# Patient Record
Sex: FEMALE | Race: WHITE | NOT HISPANIC OR LATINO
[De-identification: names, ages, dates, MRNs, and addresses within clinical notes are randomized per-mention and may not be internally consistent; named-entity substitution may affect disease eponyms.]

---

## 2018-03-01 PROBLEM — Z00.00 ENCOUNTER FOR PREVENTIVE HEALTH EXAMINATION: Status: ACTIVE | Noted: 2018-03-01

## 2018-03-06 ENCOUNTER — TRANSCRIPTION ENCOUNTER (OUTPATIENT)
Age: 33
End: 2018-03-06

## 2018-03-06 ENCOUNTER — RESULT REVIEW (OUTPATIENT)
Age: 33
End: 2018-03-06

## 2018-03-06 ENCOUNTER — OUTPATIENT (OUTPATIENT)
Dept: OUTPATIENT SERVICES | Facility: HOSPITAL | Age: 33
LOS: 1 days | End: 2018-03-06
Payer: MEDICAID

## 2018-03-06 DIAGNOSIS — B18.2 CHRONIC VIRAL HEPATITIS C: ICD-10-CM

## 2018-03-06 DIAGNOSIS — K21.0 GASTRO-ESOPHAGEAL REFLUX DISEASE WITH ESOPHAGITIS: ICD-10-CM

## 2018-03-06 LAB — HCG UR QL: NEGATIVE — SIGNIFICANT CHANGE UP

## 2018-03-06 PROCEDURE — 88312 SPECIAL STAINS GROUP 1: CPT | Mod: 26

## 2018-03-06 PROCEDURE — 88313 SPECIAL STAINS GROUP 2: CPT

## 2018-03-06 PROCEDURE — 81025 URINE PREGNANCY TEST: CPT

## 2018-03-06 PROCEDURE — 43239 EGD BIOPSY SINGLE/MULTIPLE: CPT

## 2018-03-06 PROCEDURE — 88305 TISSUE EXAM BY PATHOLOGIST: CPT

## 2018-03-06 PROCEDURE — 88312 SPECIAL STAINS GROUP 1: CPT

## 2018-03-06 PROCEDURE — 88305 TISSUE EXAM BY PATHOLOGIST: CPT | Mod: 26

## 2018-03-06 PROCEDURE — 88313 SPECIAL STAINS GROUP 2: CPT | Mod: 26

## 2018-03-07 LAB — SURGICAL PATHOLOGY FINAL REPORT - CH: SIGNIFICANT CHANGE UP

## 2018-05-16 ENCOUNTER — APPOINTMENT (OUTPATIENT)
Dept: DERMATOLOGY | Facility: CLINIC | Age: 33
End: 2018-05-16
Payer: MEDICAID

## 2018-05-16 PROCEDURE — 99202 OFFICE O/P NEW SF 15 MIN: CPT

## 2018-06-13 ENCOUNTER — APPOINTMENT (OUTPATIENT)
Dept: DERMATOLOGY | Facility: CLINIC | Age: 33
End: 2018-06-13

## 2019-02-10 ENCOUNTER — EMERGENCY (EMERGENCY)
Facility: HOSPITAL | Age: 34
LOS: 1 days | Discharge: OTHER INSTITUTION | End: 2019-02-10
Attending: EMERGENCY MEDICINE | Admitting: EMERGENCY MEDICINE
Payer: MEDICAID

## 2019-02-10 ENCOUNTER — EMERGENCY (EMERGENCY)
Facility: HOSPITAL | Age: 34
LOS: 1 days | Discharge: HOME | End: 2019-02-10
Attending: EMERGENCY MEDICINE | Admitting: EMERGENCY MEDICINE
Payer: COMMERCIAL

## 2019-02-10 ENCOUNTER — INPATIENT (INPATIENT)
Facility: HOSPITAL | Age: 34
LOS: 15 days | Discharge: HOME | End: 2019-02-26
Attending: PSYCHIATRY & NEUROLOGY | Admitting: PSYCHIATRY & NEUROLOGY
Payer: MEDICAID

## 2019-02-10 VITALS — WEIGHT: 170.2 LBS | RESPIRATION RATE: 18 BRPM | TEMPERATURE: 99 F | HEIGHT: 62 IN

## 2019-02-10 VITALS
RESPIRATION RATE: 18 BRPM | SYSTOLIC BLOOD PRESSURE: 133 MMHG | OXYGEN SATURATION: 100 % | HEART RATE: 84 BPM | DIASTOLIC BLOOD PRESSURE: 71 MMHG | TEMPERATURE: 98 F

## 2019-02-10 VITALS
TEMPERATURE: 98 F | RESPIRATION RATE: 17 BRPM | HEART RATE: 73 BPM | SYSTOLIC BLOOD PRESSURE: 109 MMHG | DIASTOLIC BLOOD PRESSURE: 68 MMHG | OXYGEN SATURATION: 99 %

## 2019-02-10 VITALS
HEART RATE: 92 BPM | OXYGEN SATURATION: 100 % | TEMPERATURE: 99 F | SYSTOLIC BLOOD PRESSURE: 128 MMHG | DIASTOLIC BLOOD PRESSURE: 74 MMHG | RESPIRATION RATE: 16 BRPM

## 2019-02-10 VITALS
HEART RATE: 89 BPM | SYSTOLIC BLOOD PRESSURE: 133 MMHG | OXYGEN SATURATION: 100 % | TEMPERATURE: 99 F | WEIGHT: 169.98 LBS | RESPIRATION RATE: 16 BRPM | DIASTOLIC BLOOD PRESSURE: 71 MMHG

## 2019-02-10 DIAGNOSIS — F39 UNSPECIFIED MOOD [AFFECTIVE] DISORDER: ICD-10-CM

## 2019-02-10 DIAGNOSIS — F19.10 OTHER PSYCHOACTIVE SUBSTANCE ABUSE, UNCOMPLICATED: ICD-10-CM

## 2019-02-10 DIAGNOSIS — F32.9 MAJOR DEPRESSIVE DISORDER, SINGLE EPISODE, UNSPECIFIED: ICD-10-CM

## 2019-02-10 DIAGNOSIS — R45.851 SUICIDAL IDEATIONS: ICD-10-CM

## 2019-02-10 DIAGNOSIS — Z34.90 ENCOUNTER FOR SUPERVISION OF NORMAL PREGNANCY, UNSPECIFIED, UNSPECIFIED TRIMESTER: ICD-10-CM

## 2019-02-10 LAB
ALBUMIN SERPL ELPH-MCNC: 4 G/DL — SIGNIFICANT CHANGE UP (ref 3.3–5)
ALP SERPL-CCNC: 65 U/L — SIGNIFICANT CHANGE UP (ref 40–120)
ALT FLD-CCNC: 70 U/L — HIGH (ref 10–45)
AMPHET UR-MCNC: NEGATIVE — SIGNIFICANT CHANGE UP
ANION GAP SERPL CALC-SCNC: 15 MMOL/L — SIGNIFICANT CHANGE UP (ref 5–17)
APAP SERPL-MCNC: <15 UG/ML — SIGNIFICANT CHANGE UP (ref 10–30)
APPEARANCE UR: CLEAR — SIGNIFICANT CHANGE UP
AST SERPL-CCNC: 38 U/L — SIGNIFICANT CHANGE UP (ref 10–40)
BACTERIA # UR AUTO: NEGATIVE — SIGNIFICANT CHANGE UP
BARBITURATES UR SCN-MCNC: NEGATIVE — SIGNIFICANT CHANGE UP
BENZODIAZ UR-MCNC: NEGATIVE — SIGNIFICANT CHANGE UP
BILIRUB SERPL-MCNC: 0.3 MG/DL — SIGNIFICANT CHANGE UP (ref 0.2–1.2)
BILIRUB UR-MCNC: NEGATIVE — SIGNIFICANT CHANGE UP
BUN SERPL-MCNC: 12 MG/DL — SIGNIFICANT CHANGE UP (ref 7–23)
CALCIUM SERPL-MCNC: 9 MG/DL — SIGNIFICANT CHANGE UP (ref 8.4–10.5)
CHLORIDE SERPL-SCNC: 102 MMOL/L — SIGNIFICANT CHANGE UP (ref 96–108)
CO2 SERPL-SCNC: 20 MMOL/L — LOW (ref 22–31)
COCAINE METAB.OTHER UR-MCNC: POSITIVE
COLOR SPEC: YELLOW — SIGNIFICANT CHANGE UP
CREAT SERPL-MCNC: 0.71 MG/DL — SIGNIFICANT CHANGE UP (ref 0.5–1.3)
DIFF PNL FLD: NEGATIVE — SIGNIFICANT CHANGE UP
EPI CELLS # UR: 3 /HPF — SIGNIFICANT CHANGE UP
ETHANOL SERPL-MCNC: SIGNIFICANT CHANGE UP MG/DL (ref 0–10)
GLUCOSE SERPL-MCNC: 104 MG/DL — HIGH (ref 70–99)
GLUCOSE UR QL: NEGATIVE — SIGNIFICANT CHANGE UP
HAV IGM SER-ACNC: SIGNIFICANT CHANGE UP
HBV CORE AB SER-ACNC: SIGNIFICANT CHANGE UP
HBV CORE IGM SER-ACNC: SIGNIFICANT CHANGE UP
HBV SURFACE AG SER-ACNC: SIGNIFICANT CHANGE UP
HBV SURFACE AG SER-ACNC: SIGNIFICANT CHANGE UP
HCG SERPL-ACNC: HIGH MIU/ML
HCT VFR BLD CALC: 38 % — SIGNIFICANT CHANGE UP (ref 34.5–45)
HCV AB S/CO SERPL IA: 16.46 S/CO — SIGNIFICANT CHANGE UP
HCV AB SERPL-IMP: REACTIVE
HGB BLD-MCNC: 13.1 G/DL — SIGNIFICANT CHANGE UP (ref 11.5–15.5)
HIV 1 & 2 AB SERPL IA.RAPID: SIGNIFICANT CHANGE UP
HYALINE CASTS # UR AUTO: 5 /LPF — HIGH (ref 0–2)
KETONES UR-MCNC: NEGATIVE — SIGNIFICANT CHANGE UP
LEUKOCYTE ESTERASE UR-ACNC: ABNORMAL
MCHC RBC-ENTMCNC: 31.4 PG — SIGNIFICANT CHANGE UP (ref 27–34)
MCHC RBC-ENTMCNC: 34.5 GM/DL — SIGNIFICANT CHANGE UP (ref 32–36)
MCV RBC AUTO: 91 FL — SIGNIFICANT CHANGE UP (ref 80–100)
METHADONE UR-MCNC: NEGATIVE — SIGNIFICANT CHANGE UP
NITRITE UR-MCNC: NEGATIVE — SIGNIFICANT CHANGE UP
OPIATES UR-MCNC: NEGATIVE — SIGNIFICANT CHANGE UP
OXYCODONE UR-MCNC: NEGATIVE — SIGNIFICANT CHANGE UP
PCP SPEC-MCNC: SIGNIFICANT CHANGE UP
PCP UR-MCNC: POSITIVE
PH UR: 6.5 — SIGNIFICANT CHANGE UP (ref 5–8)
PLATELET # BLD AUTO: 299 K/UL — SIGNIFICANT CHANGE UP (ref 150–400)
POTASSIUM SERPL-MCNC: 3.4 MMOL/L — LOW (ref 3.5–5.3)
POTASSIUM SERPL-SCNC: 3.4 MMOL/L — LOW (ref 3.5–5.3)
PROT SERPL-MCNC: 7.4 G/DL — SIGNIFICANT CHANGE UP (ref 6–8.3)
PROT UR-MCNC: ABNORMAL
RBC # BLD: 4.18 M/UL — SIGNIFICANT CHANGE UP (ref 3.8–5.2)
RBC # FLD: 13.2 % — SIGNIFICANT CHANGE UP (ref 10.3–14.5)
RBC CASTS # UR COMP ASSIST: 1 /HPF — SIGNIFICANT CHANGE UP (ref 0–4)
SALICYLATES SERPL-MCNC: <2 MG/DL — LOW (ref 15–30)
SODIUM SERPL-SCNC: 137 MMOL/L — SIGNIFICANT CHANGE UP (ref 135–145)
SP GR SPEC: 1.03 — HIGH (ref 1.01–1.02)
THC UR QL: POSITIVE
UROBILINOGEN FLD QL: ABNORMAL
WBC # BLD: 9.5 K/UL — SIGNIFICANT CHANGE UP (ref 3.8–10.5)
WBC # FLD AUTO: 9.5 K/UL — SIGNIFICANT CHANGE UP (ref 3.8–10.5)
WBC UR QL: 96 /HPF — HIGH (ref 0–5)

## 2019-02-10 PROCEDURE — 76817 TRANSVAGINAL US OBSTETRIC: CPT

## 2019-02-10 PROCEDURE — 86703 HIV-1/HIV-2 1 RESULT ANTBDY: CPT

## 2019-02-10 PROCEDURE — 93976 VASCULAR STUDY: CPT | Mod: 26

## 2019-02-10 PROCEDURE — 99053 MED SERV 10PM-8AM 24 HR FAC: CPT

## 2019-02-10 PROCEDURE — 87521 HEPATITIS C PROBE&RVRS TRNSC: CPT

## 2019-02-10 PROCEDURE — 80053 COMPREHEN METABOLIC PANEL: CPT

## 2019-02-10 PROCEDURE — 76817 TRANSVAGINAL US OBSTETRIC: CPT | Mod: 26

## 2019-02-10 PROCEDURE — 86780 TREPONEMA PALLIDUM: CPT

## 2019-02-10 PROCEDURE — 87086 URINE CULTURE/COLONY COUNT: CPT

## 2019-02-10 PROCEDURE — 93005 ELECTROCARDIOGRAM TRACING: CPT

## 2019-02-10 PROCEDURE — 81001 URINALYSIS AUTO W/SCOPE: CPT

## 2019-02-10 PROCEDURE — 90792 PSYCH DIAG EVAL W/MED SRVCS: CPT | Mod: GC

## 2019-02-10 PROCEDURE — 99285 EMERGENCY DEPT VISIT HI MDM: CPT | Mod: 25

## 2019-02-10 PROCEDURE — 93976 VASCULAR STUDY: CPT

## 2019-02-10 PROCEDURE — 84702 CHORIONIC GONADOTROPIN TEST: CPT

## 2019-02-10 PROCEDURE — 80307 DRUG TEST PRSMV CHEM ANLYZR: CPT

## 2019-02-10 PROCEDURE — 93975 VASCULAR STUDY: CPT

## 2019-02-10 PROCEDURE — 96372 THER/PROPH/DIAG INJ SC/IM: CPT

## 2019-02-10 PROCEDURE — 80074 ACUTE HEPATITIS PANEL: CPT

## 2019-02-10 PROCEDURE — 87340 HEPATITIS B SURFACE AG IA: CPT

## 2019-02-10 PROCEDURE — 85027 COMPLETE CBC AUTOMATED: CPT

## 2019-02-10 PROCEDURE — 86704 HEP B CORE ANTIBODY TOTAL: CPT

## 2019-02-10 RX ORDER — DIPHENHYDRAMINE HCL 50 MG
50 CAPSULE ORAL EVERY 6 HOURS
Qty: 0 | Refills: 0 | Status: DISCONTINUED | OUTPATIENT
Start: 2019-02-10 | End: 2019-02-26

## 2019-02-10 RX ORDER — PROCHLORPERAZINE MALEATE 5 MG
10 TABLET ORAL EVERY 8 HOURS
Qty: 0 | Refills: 0 | Status: DISCONTINUED | OUTPATIENT
Start: 2019-02-10 | End: 2019-02-19

## 2019-02-10 RX ORDER — FOLIC ACID 0.8 MG
1 TABLET ORAL DAILY
Qty: 0 | Refills: 0 | Status: DISCONTINUED | OUTPATIENT
Start: 2019-02-10 | End: 2019-02-11

## 2019-02-10 RX ORDER — HALOPERIDOL DECANOATE 100 MG/ML
5 INJECTION INTRAMUSCULAR ONCE
Qty: 0 | Refills: 0 | Status: DISCONTINUED | OUTPATIENT
Start: 2019-02-10 | End: 2019-02-26

## 2019-02-10 RX ORDER — HYDROXYZINE HCL 10 MG
50 TABLET ORAL EVERY 4 HOURS
Qty: 0 | Refills: 0 | Status: DISCONTINUED | OUTPATIENT
Start: 2019-02-10 | End: 2019-02-11

## 2019-02-10 RX ORDER — METRONIDAZOLE 500 MG
2000 TABLET ORAL ONCE
Qty: 0 | Refills: 0 | Status: COMPLETED | OUTPATIENT
Start: 2019-02-10 | End: 2019-02-10

## 2019-02-10 RX ORDER — RALTEGRAVIR 400 MG/1
400 TABLET, FILM COATED ORAL ONCE
Qty: 0 | Refills: 0 | Status: COMPLETED | OUTPATIENT
Start: 2019-02-10 | End: 2019-02-10

## 2019-02-10 RX ORDER — EMTRICITABINE AND TENOFOVIR DISOPROXIL FUMARATE 200; 300 MG/1; MG/1
1 TABLET, FILM COATED ORAL ONCE
Qty: 0 | Refills: 0 | Status: COMPLETED | OUTPATIENT
Start: 2019-02-10 | End: 2019-02-10

## 2019-02-10 RX ORDER — ALBUTEROL 90 UG/1
2 AEROSOL, METERED ORAL EVERY 6 HOURS
Qty: 0 | Refills: 0 | Status: DISCONTINUED | OUTPATIENT
Start: 2019-02-10 | End: 2019-02-26

## 2019-02-10 RX ORDER — BUPRENORPHINE AND NALOXONE 2; .5 MG/1; MG/1
1 TABLET SUBLINGUAL EVERY 4 HOURS
Qty: 0 | Refills: 0 | Status: DISCONTINUED | OUTPATIENT
Start: 2019-02-10 | End: 2019-02-13

## 2019-02-10 RX ORDER — THIAMINE MONONITRATE (VIT B1) 100 MG
100 TABLET ORAL DAILY
Qty: 0 | Refills: 0 | Status: DISCONTINUED | OUTPATIENT
Start: 2019-02-10 | End: 2019-02-11

## 2019-02-10 RX ORDER — CEFTRIAXONE 500 MG/1
250 INJECTION, POWDER, FOR SOLUTION INTRAMUSCULAR; INTRAVENOUS ONCE
Qty: 0 | Refills: 0 | Status: COMPLETED | OUTPATIENT
Start: 2019-02-10 | End: 2019-02-10

## 2019-02-10 RX ORDER — AZITHROMYCIN 500 MG/1
1000 TABLET, FILM COATED ORAL ONCE
Qty: 0 | Refills: 0 | Status: COMPLETED | OUTPATIENT
Start: 2019-02-10 | End: 2019-02-10

## 2019-02-10 RX ORDER — HALOPERIDOL DECANOATE 100 MG/ML
5 INJECTION INTRAMUSCULAR EVERY 6 HOURS
Qty: 0 | Refills: 0 | Status: DISCONTINUED | OUTPATIENT
Start: 2019-02-10 | End: 2019-02-26

## 2019-02-10 RX ORDER — LOPERAMIDE HCL 2 MG
2 TABLET ORAL EVERY 4 HOURS
Qty: 0 | Refills: 0 | Status: DISCONTINUED | OUTPATIENT
Start: 2019-02-10 | End: 2019-02-26

## 2019-02-10 RX ORDER — LANOLIN ALCOHOL/MO/W.PET/CERES
3 CREAM (GRAM) TOPICAL AT BEDTIME
Qty: 0 | Refills: 0 | Status: DISCONTINUED | OUTPATIENT
Start: 2019-02-10 | End: 2019-02-19

## 2019-02-10 RX ADMIN — EMTRICITABINE AND TENOFOVIR DISOPROXIL FUMARATE 1 TABLET(S): 200; 300 TABLET, FILM COATED ORAL at 10:03

## 2019-02-10 RX ADMIN — Medication 2000 MILLIGRAM(S): at 10:03

## 2019-02-10 RX ADMIN — RALTEGRAVIR 400 MILLIGRAM(S): 400 TABLET, FILM COATED ORAL at 10:03

## 2019-02-10 RX ADMIN — CEFTRIAXONE 250 MILLIGRAM(S): 500 INJECTION, POWDER, FOR SOLUTION INTRAMUSCULAR; INTRAVENOUS at 10:02

## 2019-02-10 RX ADMIN — AZITHROMYCIN 1000 MILLIGRAM(S): 500 TABLET, FILM COATED ORAL at 10:03

## 2019-02-10 NOTE — ED PROVIDER NOTE - PHYSICAL EXAMINATION
Gen: Alert, crying  Head: NC, AT,  EOMI, normal lids; injected conjunctiva  ENT:  normal hearing, patent oropharynx without erythema/exudate  Neck: +supple, no meningismus/JVD, +Trachea midline, mild erythema L anterior neck, mild ttp  Chest: no chest wall tenderness, equal chest rise  Pulm: Bilateral BS, normal resp effort, no wheeze/stridor/retractions  CV: RRR, no M/R/G, +dist pulses  Abd: +BS, soft, NT/ND  Rectal: deferred  pelvic: deferred (will need safe nurse)  Neuro: AAOx3
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
## 2019-02-10 NOTE — CONSULT NOTE ADULT - ATTENDING COMMENTS
Patient seen at bedside and agree with PGY 2 note.   Understandably unable to make further decisions regarding pregnancy at this time. Explained to patient options regarding continuing the pregnancy vs termination and that the decision is her own and she will be supported either way.    Patient to follow up in clinic regarding obstetrical care    Jaqueline LOYA

## 2019-02-10 NOTE — ED BEHAVIORAL HEALTH ASSESSMENT NOTE - AXIS IV
Occupational problems/Problem related to social environment/Problems with interaction with legal system

## 2019-02-10 NOTE — ED PROVIDER NOTE - PHYSICAL EXAMINATION
Physical Exam:   constitutional -  awake and alert, oriented x3, tearful   head - no external evidence of trauma  neck - no external evidence of trauma. no ecchymosis, no midline or paraspinous cervical tenderness, no step offs, no ecchymosis.   cvs - rrr, no murmurs, no peripheral edema  resp - breath sounds clear and equal bilat  gi - abdomen soft and nontender, no rigidity, guarding or rebound, bowel sounds present  msk - moving all extremities spontaneously  neuro - alert and oriented x3, no focal deficits, CNs 2-12 grossly intact  skin- no jaundice, warm and dry. old acne scarring to back. no external signs of trauma to back, torso, chest, breasts, buttocks or legs.   psych - tearful, + SI w plan   gu - performed in conjunction w ALEKSANDR PALMA. no external signs of trauma to genitalia external or internal. whitish discharge in vaginal vault. no cmt or adnexal tenderness. Physical Exam:   constitutional -  awake and alert, oriented x3, tearful   head - no external evidence of trauma  neck - no external evidence of trauma. no ecchymosis, no midline or paraspinous cervical tenderness, no step offs, no ecchymosis.   cvs - rrr, no murmurs, no peripheral edema  resp - breath sounds clear and equal bilat  gi - abdomen soft and nontender, no rigidity, guarding or rebound, bowel sounds present  msk - moving all extremities spontaneously  neuro - alert and oriented x3, no focal deficits, CNs 2-12 grossly intact  skin- no jaundice, warm and dry. old acne scarring to back. no external signs of trauma to back, torso, chest, breasts, buttocks or legs.   psych - tearful, + SI w plan   gu - performed in conjunction w ALEKSANDR PALMA. no external signs of trauma to genitalia external or internal. whitish discharge in vaginal vault. no cmt or adnexal tenderness. please see ALEKSANDR RN documentation for further details.
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
## 2019-02-10 NOTE — ED BEHAVIORAL HEALTH ASSESSMENT NOTE - DESCRIPTION (FIRST USE, LAST USE, QUANTITY, FREQUENCY, DURATION)
patient smokes marijuana occasionally daily crack cocaine use history of heroin abuse, with relapse 2 weeks ago drinks nightly drinks 2 [employer]Pedro's/day for the last few weeks. Hx of withdrawal seizures in the past.

## 2019-02-10 NOTE — ED BEHAVIORAL HEALTH ASSESSMENT NOTE - OTHER
transfer from [facility] ER domiciled with roommate transfer from outside hospital ER
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
## 2019-02-10 NOTE — CONSULT NOTE ADULT - PROBLEM SELECTOR RECOMMENDATION 9
- TVUS performed w/ TIUP  - Patient to go to [facility] for further treatment   - Recommend full STI panel and workup, PrEP - pending from SANE team this morning. Patient counseled on pregnancy and at this time, patient is unable to make further decision regarding pregnancy. Explained to patient options regarding continuing the pregnancy vs termination and that the decision is her own and she will be supported either way. Patient emotional and says she would like time. In setting of rh negative, will need rhogam for any episodes of vaginal bleeding. Patient should follow up with OBGYN within 1-2 weeks to discuss plan for pregnancy and get the adequate OB care. She may follow up in [facility] () and [facility] (). All questions answered and support provided.    Seen w/ Dr. Jaqueline Villatoro, PGY-2
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
## 2019-02-10 NOTE — ED BEHAVIORAL HEALTH ASSESSMENT NOTE - DIFFERENTIAL
1. MDD   2. Polysubstance Abuse 1. mood disorder NOS, r/o substance-induced mood d/o  2. Polysubstance Abuse

## 2019-02-10 NOTE — ED BEHAVIORAL HEALTH ASSESSMENT NOTE - SUBSTANCE ISSUES AND PLAN (INCLUDE STANDING AND PRN MEDICATION)
CIWA monitoring with sx-triggered Ativan 2mg PO q2h PRN sx-triggered CIWA.  ((Fetus has had multiple exposures per pt (crack, heroin, alcohol)-- in this case we are balancing risks of Ativan (category D, concern for slightly increased risk for cleft palate with limited/inconsistent evidence) against risk of untreated withdrawal which may be life threatening in severe cases)) CIWA monitoring with sx-triggered Ativan 2mg PO q2h PRN sx-triggered CIWA.  ((Fetus has had multiple exposures per pt (crack, heroin, alcohol, caffeine)-- in this case we are balancing risks of Ativan (category D, concern for slightly increased risk for cleft palate with limited/inconsistent evidence) against risk of untreated withdrawal which may be life threatening in severe cases))
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
## 2019-02-10 NOTE — ED PROVIDER NOTE - NS ED ROS FT
ROS:   constitutional - no fever, no chills  eyes - no visual changes, no redness  eent - no sore throat, no nasal congestion  cvs - no chest pain, no leg swelling  resp - no shortness of breath, no cough  gi - no abdominal pain, no vomiting, no diarrhea  gu - + dysuria, no hematuria  msk - no acute back pain, no joint swelling  skin - no rashes, no jaundice  neuro - no headache, no focal weakness  psych - no acute mental health issue
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
## 2019-02-11 LAB
HBA1C BLD-MCNC: 5.2 % — SIGNIFICANT CHANGE UP (ref 4–5.6)
HCV RNA FLD QL NAA+PROBE: SIGNIFICANT CHANGE UP
HCV RNA SPEC QL PROBE+SIG AMP: DETECTED
T PALLIDUM AB TITR SER: NEGATIVE — SIGNIFICANT CHANGE UP
TSH SERPL-MCNC: 0.23 UIU/ML — LOW (ref 0.27–4.2)

## 2019-02-11 PROCEDURE — 99222 1ST HOSP IP/OBS MODERATE 55: CPT | Mod: GC

## 2019-02-11 RX ORDER — DIPHENHYDRAMINE HCL 50 MG
50 CAPSULE ORAL EVERY 6 HOURS
Qty: 0 | Refills: 0 | Status: DISCONTINUED | OUTPATIENT
Start: 2019-02-11 | End: 2019-02-11

## 2019-02-11 RX ADMIN — Medication 3 MILLIGRAM(S): at 21:19

## 2019-02-11 RX ADMIN — Medication 1 TABLET(S): at 11:05

## 2019-02-11 NOTE — ED POST DISCHARGE NOTE - DETAILS
02/11/19 approx 03:50am findings of abnormal laboratory results conveyed to Dr. Yee at [facility]. Team aware of results as per provider. Allen PARADA

## 2019-02-12 PROCEDURE — 99232 SBSQ HOSP IP/OBS MODERATE 35: CPT

## 2019-02-12 RX ADMIN — BUPRENORPHINE AND NALOXONE 1 TABLET(S): 2; .5 TABLET SUBLINGUAL at 15:18

## 2019-02-12 RX ADMIN — Medication 3 MILLIGRAM(S): at 21:05

## 2019-02-12 RX ADMIN — Medication 1 TABLET(S): at 10:32

## 2019-02-13 PROBLEM — N80.9 ENDOMETRIOSIS, UNSPECIFIED: Chronic | Status: ACTIVE | Noted: 2019-02-10

## 2019-02-13 PROCEDURE — 90853 GROUP PSYCHOTHERAPY: CPT

## 2019-02-13 PROCEDURE — 99232 SBSQ HOSP IP/OBS MODERATE 35: CPT | Mod: 25

## 2019-02-13 RX ORDER — RALTEGRAVIR 400 MG/1
400 TABLET, FILM COATED ORAL
Qty: 0 | Refills: 0 | Status: COMPLETED | OUTPATIENT
Start: 2019-02-13 | End: 2019-02-20

## 2019-02-13 RX ORDER — ARIPIPRAZOLE 15 MG/1
2 TABLET ORAL DAILY
Qty: 0 | Refills: 0 | Status: DISCONTINUED | OUTPATIENT
Start: 2019-02-13 | End: 2019-02-15

## 2019-02-13 RX ORDER — ACETAMINOPHEN 500 MG
650 TABLET ORAL EVERY 6 HOURS
Qty: 0 | Refills: 0 | Status: DISCONTINUED | OUTPATIENT
Start: 2019-02-13 | End: 2019-02-26

## 2019-02-13 RX ORDER — EMTRICITABINE AND TENOFOVIR DISOPROXIL FUMARATE 200; 300 MG/1; MG/1
1 TABLET, FILM COATED ORAL DAILY
Qty: 0 | Refills: 0 | Status: COMPLETED | OUTPATIENT
Start: 2019-02-13 | End: 2019-02-20

## 2019-02-13 RX ADMIN — RALTEGRAVIR 400 MILLIGRAM(S): 400 TABLET, FILM COATED ORAL at 13:18

## 2019-02-13 RX ADMIN — BUPRENORPHINE AND NALOXONE 1 TABLET(S): 2; .5 TABLET SUBLINGUAL at 14:13

## 2019-02-13 RX ADMIN — Medication 650 MILLIGRAM(S): at 17:15

## 2019-02-13 RX ADMIN — Medication 1 TABLET(S): at 08:42

## 2019-02-13 RX ADMIN — Medication 10 MILLIGRAM(S): at 21:00

## 2019-02-13 RX ADMIN — Medication 3 MILLIGRAM(S): at 21:00

## 2019-02-13 RX ADMIN — RALTEGRAVIR 400 MILLIGRAM(S): 400 TABLET, FILM COATED ORAL at 21:00

## 2019-02-13 RX ADMIN — BUPRENORPHINE AND NALOXONE 1 TABLET(S): 2; .5 TABLET SUBLINGUAL at 09:23

## 2019-02-13 RX ADMIN — EMTRICITABINE AND TENOFOVIR DISOPROXIL FUMARATE 1 TABLET(S): 200; 300 TABLET, FILM COATED ORAL at 13:18

## 2019-02-13 NOTE — CHART NOTE - NSCHARTNOTEFT_GEN_A_CORE
SW received contact from Rosalie West ([phone])  with [facility].  Information on patient provided.  Rosalie reported the kids are currently safe and in foster care at this time.  Rosalie reported the patient's children's father are currently in the works of obtaining custody of his two daughters.  Contact to [facility] and this SW's contact provided to Rosalie. Rosalie appreciative of contact.  LMSW assured availability and support.  Social work will remain available.

## 2019-02-14 PROCEDURE — 99232 SBSQ HOSP IP/OBS MODERATE 35: CPT

## 2019-02-14 RX ORDER — DOCUSATE SODIUM 100 MG
100 CAPSULE ORAL
Qty: 0 | Refills: 0 | Status: DISCONTINUED | OUTPATIENT
Start: 2019-02-14 | End: 2019-02-26

## 2019-02-14 RX ADMIN — EMTRICITABINE AND TENOFOVIR DISOPROXIL FUMARATE 1 TABLET(S): 200; 300 TABLET, FILM COATED ORAL at 09:18

## 2019-02-14 RX ADMIN — Medication 1 TABLET(S): at 09:18

## 2019-02-14 RX ADMIN — Medication 3 MILLIGRAM(S): at 21:16

## 2019-02-14 RX ADMIN — Medication 10 MILLIGRAM(S): at 09:18

## 2019-02-14 RX ADMIN — RALTEGRAVIR 400 MILLIGRAM(S): 400 TABLET, FILM COATED ORAL at 21:16

## 2019-02-14 RX ADMIN — Medication 10 MILLIGRAM(S): at 21:16

## 2019-02-14 RX ADMIN — ARIPIPRAZOLE 2 MILLIGRAM(S): 15 TABLET ORAL at 09:18

## 2019-02-14 RX ADMIN — RALTEGRAVIR 400 MILLIGRAM(S): 400 TABLET, FILM COATED ORAL at 09:18

## 2019-02-14 RX ADMIN — Medication 100 MILLIGRAM(S): at 21:16

## 2019-02-15 PROCEDURE — 99232 SBSQ HOSP IP/OBS MODERATE 35: CPT

## 2019-02-15 RX ORDER — ARIPIPRAZOLE 15 MG/1
5 TABLET ORAL DAILY
Qty: 0 | Refills: 0 | Status: DISCONTINUED | OUTPATIENT
Start: 2019-02-15 | End: 2019-02-26

## 2019-02-15 RX ADMIN — RALTEGRAVIR 400 MILLIGRAM(S): 400 TABLET, FILM COATED ORAL at 21:21

## 2019-02-15 RX ADMIN — Medication 100 MILLIGRAM(S): at 10:45

## 2019-02-15 RX ADMIN — RALTEGRAVIR 400 MILLIGRAM(S): 400 TABLET, FILM COATED ORAL at 10:44

## 2019-02-15 RX ADMIN — Medication 3 MILLIGRAM(S): at 21:21

## 2019-02-15 RX ADMIN — ARIPIPRAZOLE 2 MILLIGRAM(S): 15 TABLET ORAL at 10:46

## 2019-02-15 RX ADMIN — Medication 1 TABLET(S): at 10:44

## 2019-02-15 RX ADMIN — EMTRICITABINE AND TENOFOVIR DISOPROXIL FUMARATE 1 TABLET(S): 200; 300 TABLET, FILM COATED ORAL at 10:45

## 2019-02-15 RX ADMIN — Medication 100 MILLIGRAM(S): at 21:21

## 2019-02-16 LAB
BLD GP AB SCN SERPL QL: NEGATIVE — SIGNIFICANT CHANGE UP
HBV SURFACE AG SER-ACNC: NEGATIVE — SIGNIFICANT CHANGE UP
RH IG SCN BLD-IMP: NEGATIVE — SIGNIFICANT CHANGE UP

## 2019-02-16 RX ADMIN — ARIPIPRAZOLE 5 MILLIGRAM(S): 15 TABLET ORAL at 09:58

## 2019-02-16 RX ADMIN — EMTRICITABINE AND TENOFOVIR DISOPROXIL FUMARATE 1 TABLET(S): 200; 300 TABLET, FILM COATED ORAL at 09:58

## 2019-02-16 RX ADMIN — Medication 650 MILLIGRAM(S): at 18:45

## 2019-02-16 RX ADMIN — RALTEGRAVIR 400 MILLIGRAM(S): 400 TABLET, FILM COATED ORAL at 09:59

## 2019-02-16 RX ADMIN — Medication 650 MILLIGRAM(S): at 17:44

## 2019-02-16 RX ADMIN — Medication 100 MILLIGRAM(S): at 21:07

## 2019-02-16 RX ADMIN — Medication 100 MILLIGRAM(S): at 09:58

## 2019-02-16 RX ADMIN — Medication 1 TABLET(S): at 09:58

## 2019-02-16 RX ADMIN — RALTEGRAVIR 400 MILLIGRAM(S): 400 TABLET, FILM COATED ORAL at 21:07

## 2019-02-16 RX ADMIN — Medication 3 MILLIGRAM(S): at 21:07

## 2019-02-17 LAB
BACTERIA UR CULT: SIGNIFICANT CHANGE UP
HCV AB S/CO SERPL IA: 14.17 S/CO — SIGNIFICANT CHANGE UP
HCV AB SERPL-IMP: REACTIVE — SIGNIFICANT CHANGE UP
RUBV IGG SER-ACNC: 2.8 INDEX — SIGNIFICANT CHANGE UP
RUBV IGG SER-IMP: POSITIVE — SIGNIFICANT CHANGE UP
SPECIMEN SOURCE: SIGNIFICANT CHANGE UP
T PALLIDUM AB TITR SER: NEGATIVE — SIGNIFICANT CHANGE UP
URATE SERPL-MCNC: 2.8 MG/DL — SIGNIFICANT CHANGE UP (ref 2.5–7)
VZV IGG FLD QL IA: 92.2 INDEX — SIGNIFICANT CHANGE UP
VZV IGG FLD QL IA: NEGATIVE — SIGNIFICANT CHANGE UP

## 2019-02-17 RX ADMIN — RALTEGRAVIR 400 MILLIGRAM(S): 400 TABLET, FILM COATED ORAL at 21:33

## 2019-02-17 RX ADMIN — Medication 1 TABLET(S): at 09:59

## 2019-02-17 RX ADMIN — ARIPIPRAZOLE 5 MILLIGRAM(S): 15 TABLET ORAL at 09:58

## 2019-02-17 RX ADMIN — Medication 10 MILLIGRAM(S): at 21:37

## 2019-02-17 RX ADMIN — EMTRICITABINE AND TENOFOVIR DISOPROXIL FUMARATE 1 TABLET(S): 200; 300 TABLET, FILM COATED ORAL at 09:59

## 2019-02-17 RX ADMIN — Medication 100 MILLIGRAM(S): at 09:58

## 2019-02-17 RX ADMIN — RALTEGRAVIR 400 MILLIGRAM(S): 400 TABLET, FILM COATED ORAL at 09:59

## 2019-02-17 RX ADMIN — Medication 10 MILLIGRAM(S): at 08:27

## 2019-02-17 RX ADMIN — Medication 3 MILLIGRAM(S): at 21:33

## 2019-02-17 RX ADMIN — Medication 100 MILLIGRAM(S): at 21:33

## 2019-02-18 LAB — LEAD SERPL-MCNC: < 1 UG/DL — SIGNIFICANT CHANGE UP (ref 0–4)

## 2019-02-18 RX ADMIN — RALTEGRAVIR 400 MILLIGRAM(S): 400 TABLET, FILM COATED ORAL at 09:28

## 2019-02-18 RX ADMIN — ARIPIPRAZOLE 5 MILLIGRAM(S): 15 TABLET ORAL at 09:28

## 2019-02-18 RX ADMIN — Medication 1 TABLET(S): at 09:28

## 2019-02-18 RX ADMIN — Medication 100 MILLIGRAM(S): at 09:28

## 2019-02-18 RX ADMIN — EMTRICITABINE AND TENOFOVIR DISOPROXIL FUMARATE 1 TABLET(S): 200; 300 TABLET, FILM COATED ORAL at 09:28

## 2019-02-18 RX ADMIN — Medication 10 MILLIGRAM(S): at 21:22

## 2019-02-18 RX ADMIN — Medication 10 MILLIGRAM(S): at 10:38

## 2019-02-18 RX ADMIN — Medication 100 MILLIGRAM(S): at 21:22

## 2019-02-18 RX ADMIN — Medication 3 MILLIGRAM(S): at 21:22

## 2019-02-18 RX ADMIN — RALTEGRAVIR 400 MILLIGRAM(S): 400 TABLET, FILM COATED ORAL at 21:22

## 2019-02-19 LAB
HGB A MFR BLD: 97 % — SIGNIFICANT CHANGE UP
HGB A2 MFR BLD: 2.7 % — SIGNIFICANT CHANGE UP (ref 2.4–3.5)
HGB ELECT COMMENT: SIGNIFICANT CHANGE UP
HGB F MFR BLD: < 1 % — SIGNIFICANT CHANGE UP (ref 0–1.5)

## 2019-02-19 PROCEDURE — 90853 GROUP PSYCHOTHERAPY: CPT

## 2019-02-19 PROCEDURE — 99232 SBSQ HOSP IP/OBS MODERATE 35: CPT | Mod: 25

## 2019-02-19 RX ORDER — DOXYLAMINE SUCCINATE AND PYRIDOXINE HYDROCHLORIDE, DELAYED RELEASE TABLETS 10 MG/10 MG 10; 10 MG/1; MG/1
2 TABLET, DELAYED RELEASE ORAL AT BEDTIME
Qty: 0 | Refills: 0 | Status: DISCONTINUED | OUTPATIENT
Start: 2019-02-19 | End: 2019-02-26

## 2019-02-19 RX ORDER — LANOLIN ALCOHOL/MO/W.PET/CERES
5 CREAM (GRAM) TOPICAL AT BEDTIME
Qty: 0 | Refills: 0 | Status: DISCONTINUED | OUTPATIENT
Start: 2019-02-19 | End: 2019-02-26

## 2019-02-19 RX ORDER — INFLUENZA VIRUS VACCINE 15; 15; 15; 15 UG/.5ML; UG/.5ML; UG/.5ML; UG/.5ML
0.5 SUSPENSION INTRAMUSCULAR ONCE
Qty: 0 | Refills: 0 | Status: COMPLETED | OUTPATIENT
Start: 2019-02-19 | End: 2019-02-21

## 2019-02-19 RX ADMIN — RALTEGRAVIR 400 MILLIGRAM(S): 400 TABLET, FILM COATED ORAL at 20:48

## 2019-02-19 RX ADMIN — Medication 100 MILLIGRAM(S): at 09:36

## 2019-02-19 RX ADMIN — Medication 5 MILLIGRAM(S): at 20:48

## 2019-02-19 RX ADMIN — RALTEGRAVIR 400 MILLIGRAM(S): 400 TABLET, FILM COATED ORAL at 09:36

## 2019-02-19 RX ADMIN — Medication 100 MILLIGRAM(S): at 20:48

## 2019-02-19 RX ADMIN — ARIPIPRAZOLE 5 MILLIGRAM(S): 15 TABLET ORAL at 09:36

## 2019-02-19 RX ADMIN — EMTRICITABINE AND TENOFOVIR DISOPROXIL FUMARATE 1 TABLET(S): 200; 300 TABLET, FILM COATED ORAL at 09:36

## 2019-02-19 RX ADMIN — Medication 1 TABLET(S): at 09:36

## 2019-02-19 RX ADMIN — DOXYLAMINE SUCCINATE AND PYRIDOXINE HYDROCHLORIDE, DELAYED RELEASE TABLETS 10 MG/10 MG 2 TABLET(S): 10; 10 TABLET, DELAYED RELEASE ORAL at 20:48

## 2019-02-20 LAB
GAMMA INTERFERON BACKGROUND BLD IA-ACNC: 0.01 IU/ML — SIGNIFICANT CHANGE UP
M TB IFN-G BLD-IMP: NEGATIVE — SIGNIFICANT CHANGE UP
M TB IFN-G CD4+ BCKGRND COR BLD-ACNC: 0 IU/ML — SIGNIFICANT CHANGE UP
M TB IFN-G CD4+CD8+ BCKGRND COR BLD-ACNC: 0 IU/ML — SIGNIFICANT CHANGE UP
QUANT TB PLUS MITOGEN MINUS NIL: 6.99 IU/ML — SIGNIFICANT CHANGE UP

## 2019-02-20 PROCEDURE — 99232 SBSQ HOSP IP/OBS MODERATE 35: CPT

## 2019-02-20 RX ADMIN — Medication 5 MILLIGRAM(S): at 21:38

## 2019-02-20 RX ADMIN — EMTRICITABINE AND TENOFOVIR DISOPROXIL FUMARATE 1 TABLET(S): 200; 300 TABLET, FILM COATED ORAL at 09:04

## 2019-02-20 RX ADMIN — Medication 1 TABLET(S): at 09:04

## 2019-02-20 RX ADMIN — ARIPIPRAZOLE 5 MILLIGRAM(S): 15 TABLET ORAL at 09:04

## 2019-02-20 RX ADMIN — RALTEGRAVIR 400 MILLIGRAM(S): 400 TABLET, FILM COATED ORAL at 09:04

## 2019-02-20 RX ADMIN — DOXYLAMINE SUCCINATE AND PYRIDOXINE HYDROCHLORIDE, DELAYED RELEASE TABLETS 10 MG/10 MG 2 TABLET(S): 10; 10 TABLET, DELAYED RELEASE ORAL at 21:38

## 2019-02-20 RX ADMIN — Medication 100 MILLIGRAM(S): at 21:38

## 2019-02-20 RX ADMIN — Medication 100 MILLIGRAM(S): at 09:04

## 2019-02-21 ENCOUNTER — LABORATORY RESULT (OUTPATIENT)
Age: 34
End: 2019-02-21

## 2019-02-21 ENCOUNTER — RESULT REVIEW (OUTPATIENT)
Age: 34
End: 2019-02-21

## 2019-02-21 ENCOUNTER — OUTPATIENT (OUTPATIENT)
Dept: OUTPATIENT SERVICES | Facility: HOSPITAL | Age: 34
LOS: 1 days | End: 2019-02-21

## 2019-02-21 ENCOUNTER — APPOINTMENT (OUTPATIENT)
Dept: OBGYN | Facility: HOSPITAL | Age: 34
End: 2019-02-21

## 2019-02-21 ENCOUNTER — NON-APPOINTMENT (OUTPATIENT)
Age: 34
End: 2019-02-21

## 2019-02-21 VITALS
HEIGHT: 62 IN | SYSTOLIC BLOOD PRESSURE: 105 MMHG | BODY MASS INDEX: 36.25 KG/M2 | DIASTOLIC BLOOD PRESSURE: 64 MMHG | HEART RATE: 84 BPM | WEIGHT: 197 LBS

## 2019-02-21 DIAGNOSIS — F19.90 OTHER PSYCHOACTIVE SUBSTANCE USE, UNSPECIFIED, UNCOMPLICATED: ICD-10-CM

## 2019-02-21 DIAGNOSIS — F41.9 ANXIETY DISORDER, UNSPECIFIED: ICD-10-CM

## 2019-02-21 DIAGNOSIS — F90.9 ATTENTION-DEFICIT HYPERACTIVITY DISORDER, UNSPECIFIED TYPE: ICD-10-CM

## 2019-02-21 DIAGNOSIS — R76.8 OTHER SPECIFIED ABNORMAL IMMUNOLOGICAL FINDINGS IN SERUM: ICD-10-CM

## 2019-02-21 DIAGNOSIS — F32.9 ANXIETY DISORDER, UNSPECIFIED: ICD-10-CM

## 2019-02-21 DIAGNOSIS — N80.9 ENDOMETRIOSIS, UNSPECIFIED: ICD-10-CM

## 2019-02-21 DIAGNOSIS — O09.529 SUPERVISION OF ELDERLY MULTIGRAVIDA, UNSPECIFIED TRIMESTER: ICD-10-CM

## 2019-02-21 DIAGNOSIS — Z82.49 FAMILY HISTORY OF ISCHEMIC HEART DISEASE AND OTHER DISEASES OF THE CIRCULATORY SYSTEM: ICD-10-CM

## 2019-02-21 DIAGNOSIS — Z87.891 PERSONAL HISTORY OF NICOTINE DEPENDENCE: ICD-10-CM

## 2019-02-21 DIAGNOSIS — J45.20 MILD INTERMITTENT ASTHMA, UNCOMPLICATED: ICD-10-CM

## 2019-02-21 DIAGNOSIS — F43.10 POST-TRAUMATIC STRESS DISORDER, UNSPECIFIED: ICD-10-CM

## 2019-02-21 DIAGNOSIS — Z83.3 FAMILY HISTORY OF DIABETES MELLITUS: ICD-10-CM

## 2019-02-21 DIAGNOSIS — O30.009 TWIN PREGNANCY, UNSPECIFIED NUMBER OF PLACENTA AND UNSPECIFIED NUMBER OF AMNIOTIC SACS, UNSPECIFIED TRIMESTER: ICD-10-CM

## 2019-02-21 DIAGNOSIS — Z78.9 OTHER SPECIFIED HEALTH STATUS: ICD-10-CM

## 2019-02-21 DIAGNOSIS — Z82.61 FAMILY HISTORY OF ARTHRITIS: ICD-10-CM

## 2019-02-21 LAB
APPEARANCE UR: CLEAR — SIGNIFICANT CHANGE UP
APTT BLD: 32.5 SEC — SIGNIFICANT CHANGE UP (ref 27.5–36.3)
BILIRUB UR-MCNC: NEGATIVE — SIGNIFICANT CHANGE UP
BLOOD UR QL VISUAL: NEGATIVE — SIGNIFICANT CHANGE UP
COLOR SPEC: YELLOW — SIGNIFICANT CHANGE UP
GLUCOSE UR-MCNC: NEGATIVE — SIGNIFICANT CHANGE UP
INR BLD: 1.05 — SIGNIFICANT CHANGE UP (ref 0.88–1.17)
KETONES UR-MCNC: NEGATIVE — SIGNIFICANT CHANGE UP
LEUKOCYTE ESTERASE UR-ACNC: NEGATIVE — SIGNIFICANT CHANGE UP
NITRITE UR-MCNC: NEGATIVE — SIGNIFICANT CHANGE UP
PH UR: 6 — SIGNIFICANT CHANGE UP (ref 5–8)
PROT UR-MCNC: 10 — SIGNIFICANT CHANGE UP
PROTHROM AB SERPL-ACNC: 12 SEC — SIGNIFICANT CHANGE UP (ref 9.8–13.1)
SP GR SPEC: 1.03 — SIGNIFICANT CHANGE UP (ref 1–1.04)
URATE SERPL-MCNC: 2.6 MG/DL — SIGNIFICANT CHANGE UP (ref 2.5–7)
UROBILINOGEN FLD QL: NORMAL — SIGNIFICANT CHANGE UP

## 2019-02-21 PROCEDURE — G0452: CPT | Mod: 26

## 2019-02-21 PROCEDURE — 99232 SBSQ HOSP IP/OBS MODERATE 35: CPT

## 2019-02-21 PROCEDURE — 88141 CYTOPATH C/V INTERPRET: CPT

## 2019-02-21 RX ORDER — ARIPIPRAZOLE 5 MG/1
5 TABLET ORAL DAILY
Qty: 30 | Refills: 0 | Status: ACTIVE | COMMUNITY
Start: 2019-02-21

## 2019-02-21 RX ORDER — GLUCOSAMINE HCL/CHONDROITIN SU 500-400 MG
3 CAPSULE ORAL
Qty: 1 | Refills: 0 | Status: ACTIVE | COMMUNITY
Start: 2019-02-21

## 2019-02-21 RX ORDER — TUBERCULIN PURIFIED PROTEIN DERIVATIVE 5 [IU]/.1ML
5 INJECTION, SOLUTION INTRADERMAL ONCE
Qty: 0 | Refills: 0 | Status: COMPLETED | OUTPATIENT
Start: 2019-02-21 | End: 2019-02-21

## 2019-02-21 RX ADMIN — Medication 100 MILLIGRAM(S): at 21:12

## 2019-02-21 RX ADMIN — Medication 1 TABLET(S): at 09:12

## 2019-02-21 RX ADMIN — DOXYLAMINE SUCCINATE AND PYRIDOXINE HYDROCHLORIDE, DELAYED RELEASE TABLETS 10 MG/10 MG 2 TABLET(S): 10; 10 TABLET, DELAYED RELEASE ORAL at 21:12

## 2019-02-21 RX ADMIN — TUBERCULIN PURIFIED PROTEIN DERIVATIVE 5 UNIT(S): 5 INJECTION, SOLUTION INTRADERMAL at 17:57

## 2019-02-21 RX ADMIN — Medication 5 MILLIGRAM(S): at 21:12

## 2019-02-21 RX ADMIN — Medication 100 MILLIGRAM(S): at 09:12

## 2019-02-21 RX ADMIN — ARIPIPRAZOLE 5 MILLIGRAM(S): 15 TABLET ORAL at 09:11

## 2019-02-21 RX ADMIN — INFLUENZA VIRUS VACCINE 0.5 MILLILITER(S): 15; 15; 15; 15 SUSPENSION INTRAMUSCULAR at 17:53

## 2019-02-22 LAB
C TRACH RRNA SPEC QL NAA+PROBE: SIGNIFICANT CHANGE UP
HCV RNA SERPL NAA DL=5-ACNC: HIGH IU/ML
HCV RNA SPEC NAA+PROBE-LOG IU: 5.94 LOGIU/ML — HIGH
HPV HIGH+LOW RISK DNA PNL CVX: SIGNIFICANT CHANGE UP
N GONORRHOEA RRNA SPEC QL NAA+PROBE: SIGNIFICANT CHANGE UP
SPECIMEN SOURCE: SIGNIFICANT CHANGE UP

## 2019-02-22 PROCEDURE — 99232 SBSQ HOSP IP/OBS MODERATE 35: CPT

## 2019-02-22 RX ORDER — POLYETHYLENE GLYCOL 3350 17 G/17G
17 POWDER, FOR SOLUTION ORAL DAILY
Qty: 0 | Refills: 0 | Status: DISCONTINUED | OUTPATIENT
Start: 2019-02-22 | End: 2019-02-26

## 2019-02-22 RX ADMIN — POLYETHYLENE GLYCOL 3350 17 GRAM(S): 17 POWDER, FOR SOLUTION ORAL at 10:50

## 2019-02-22 RX ADMIN — Medication 1 TABLET(S): at 10:08

## 2019-02-22 RX ADMIN — Medication 5 MILLIGRAM(S): at 21:41

## 2019-02-22 RX ADMIN — Medication 650 MILLIGRAM(S): at 10:58

## 2019-02-22 RX ADMIN — ARIPIPRAZOLE 5 MILLIGRAM(S): 15 TABLET ORAL at 10:08

## 2019-02-22 RX ADMIN — Medication 100 MILLIGRAM(S): at 10:08

## 2019-02-22 RX ADMIN — DOXYLAMINE SUCCINATE AND PYRIDOXINE HYDROCHLORIDE, DELAYED RELEASE TABLETS 10 MG/10 MG 2 TABLET(S): 10; 10 TABLET, DELAYED RELEASE ORAL at 21:41

## 2019-02-22 RX ADMIN — Medication 100 MILLIGRAM(S): at 21:41

## 2019-02-22 RX ADMIN — Medication 650 MILLIGRAM(S): at 10:13

## 2019-02-23 LAB
BACTERIA UR CULT: SIGNIFICANT CHANGE UP
SPECIMEN SOURCE: SIGNIFICANT CHANGE UP

## 2019-02-23 RX ORDER — SALIVA SUBSTITUTE COMB NO.11 351 MG
5 POWDER IN PACKET (EA) MUCOUS MEMBRANE EVERY 6 HOURS
Qty: 0 | Refills: 0 | Status: DISCONTINUED | OUTPATIENT
Start: 2019-02-23 | End: 2019-02-26

## 2019-02-23 RX ORDER — IBUPROFEN 200 MG
600 TABLET ORAL EVERY 6 HOURS
Qty: 0 | Refills: 0 | Status: DISCONTINUED | OUTPATIENT
Start: 2019-02-23 | End: 2019-02-26

## 2019-02-23 RX ADMIN — Medication 650 MILLIGRAM(S): at 13:16

## 2019-02-23 RX ADMIN — Medication 100 MILLIGRAM(S): at 21:14

## 2019-02-23 RX ADMIN — ARIPIPRAZOLE 5 MILLIGRAM(S): 15 TABLET ORAL at 09:21

## 2019-02-23 RX ADMIN — Medication 650 MILLIGRAM(S): at 10:58

## 2019-02-23 RX ADMIN — TUBERCULIN PURIFIED PROTEIN DERIVATIVE 5 UNIT(S): 5 INJECTION, SOLUTION INTRADERMAL at 17:52

## 2019-02-23 RX ADMIN — Medication 5 MILLIGRAM(S): at 21:14

## 2019-02-23 RX ADMIN — Medication 650 MILLIGRAM(S): at 10:03

## 2019-02-23 RX ADMIN — Medication 100 MILLIGRAM(S): at 09:21

## 2019-02-23 RX ADMIN — POLYETHYLENE GLYCOL 3350 17 GRAM(S): 17 POWDER, FOR SOLUTION ORAL at 10:03

## 2019-02-23 RX ADMIN — Medication 650 MILLIGRAM(S): at 13:58

## 2019-02-23 RX ADMIN — DOXYLAMINE SUCCINATE AND PYRIDOXINE HYDROCHLORIDE, DELAYED RELEASE TABLETS 10 MG/10 MG 2 TABLET(S): 10; 10 TABLET, DELAYED RELEASE ORAL at 21:14

## 2019-02-23 RX ADMIN — Medication 1 TABLET(S): at 09:21

## 2019-02-24 LAB — LEAD SERPL-MCNC: 1 UG/DL — SIGNIFICANT CHANGE UP (ref 0–4)

## 2019-02-24 RX ADMIN — ARIPIPRAZOLE 5 MILLIGRAM(S): 15 TABLET ORAL at 09:53

## 2019-02-24 RX ADMIN — Medication 5 MILLIGRAM(S): at 21:06

## 2019-02-24 RX ADMIN — Medication 650 MILLIGRAM(S): at 03:00

## 2019-02-24 RX ADMIN — Medication 100 MILLIGRAM(S): at 09:54

## 2019-02-24 RX ADMIN — Medication 650 MILLIGRAM(S): at 21:06

## 2019-02-24 RX ADMIN — Medication 1 TABLET(S): at 09:54

## 2019-02-24 RX ADMIN — DOXYLAMINE SUCCINATE AND PYRIDOXINE HYDROCHLORIDE, DELAYED RELEASE TABLETS 10 MG/10 MG 2 TABLET(S): 10; 10 TABLET, DELAYED RELEASE ORAL at 21:06

## 2019-02-24 RX ADMIN — Medication 100 MILLIGRAM(S): at 21:06

## 2019-02-25 DIAGNOSIS — F90.9 ATTENTION-DEFICIT HYPERACTIVITY DISORDER, UNSPECIFIED TYPE: ICD-10-CM

## 2019-02-25 DIAGNOSIS — O30.009 TWIN PREGNANCY, UNSPECIFIED NUMBER OF PLACENTA AND UNSPECIFIED NUMBER OF AMNIOTIC SACS, UNSPECIFIED TRIMESTER: ICD-10-CM

## 2019-02-25 DIAGNOSIS — R76.8 OTHER SPECIFIED ABNORMAL IMMUNOLOGICAL FINDINGS IN SERUM: ICD-10-CM

## 2019-02-25 DIAGNOSIS — F43.10 POST-TRAUMATIC STRESS DISORDER, UNSPECIFIED: ICD-10-CM

## 2019-02-25 DIAGNOSIS — F41.9 ANXIETY DISORDER, UNSPECIFIED: ICD-10-CM

## 2019-02-25 DIAGNOSIS — J45.20 MILD INTERMITTENT ASTHMA, UNCOMPLICATED: ICD-10-CM

## 2019-02-25 PROCEDURE — 99231 SBSQ HOSP IP/OBS SF/LOW 25: CPT

## 2019-02-25 RX ORDER — DOXYLAMINE SUCCINATE AND PYRIDOXINE HYDROCHLORIDE, DELAYED RELEASE TABLETS 10 MG/10 MG 10; 10 MG/1; MG/1
2 TABLET, DELAYED RELEASE ORAL
Qty: 60 | Refills: 0 | OUTPATIENT
Start: 2019-02-25 | End: 2019-03-26

## 2019-02-25 RX ORDER — DOCUSATE SODIUM 100 MG
1 CAPSULE ORAL
Qty: 60 | Refills: 0 | OUTPATIENT
Start: 2019-02-25 | End: 2019-03-26

## 2019-02-25 RX ORDER — ARIPIPRAZOLE 15 MG/1
1 TABLET ORAL
Qty: 30 | Refills: 0 | OUTPATIENT
Start: 2019-02-25 | End: 2019-03-26

## 2019-02-25 RX ORDER — LANOLIN ALCOHOL/MO/W.PET/CERES
1 CREAM (GRAM) TOPICAL
Qty: 30 | Refills: 0 | OUTPATIENT
Start: 2019-02-25 | End: 2019-03-26

## 2019-02-25 RX ADMIN — DOXYLAMINE SUCCINATE AND PYRIDOXINE HYDROCHLORIDE, DELAYED RELEASE TABLETS 10 MG/10 MG 2 TABLET(S): 10; 10 TABLET, DELAYED RELEASE ORAL at 21:15

## 2019-02-25 RX ADMIN — ARIPIPRAZOLE 5 MILLIGRAM(S): 15 TABLET ORAL at 09:41

## 2019-02-25 RX ADMIN — Medication 100 MILLIGRAM(S): at 21:15

## 2019-02-25 RX ADMIN — Medication 650 MILLIGRAM(S): at 18:46

## 2019-02-25 RX ADMIN — Medication 650 MILLIGRAM(S): at 10:20

## 2019-02-25 RX ADMIN — Medication 5 MILLIGRAM(S): at 21:15

## 2019-02-25 RX ADMIN — Medication 50 MILLIGRAM(S): at 01:40

## 2019-02-25 RX ADMIN — Medication 650 MILLIGRAM(S): at 09:30

## 2019-02-25 RX ADMIN — Medication 100 MILLIGRAM(S): at 09:43

## 2019-02-25 RX ADMIN — Medication 1 TABLET(S): at 09:43

## 2019-02-26 VITALS — RESPIRATION RATE: 18 BRPM | TEMPERATURE: 98 F

## 2019-02-26 LAB
CYTOLOGY SPEC DOC CYTO: SIGNIFICANT CHANGE UP
GAMMA INTERFERON BACKGROUND BLD IA-ACNC: 0.03 IU/ML — SIGNIFICANT CHANGE UP
M TB IFN-G BLD-IMP: NEGATIVE — SIGNIFICANT CHANGE UP
M TB IFN-G CD4+ BCKGRND COR BLD-ACNC: -0.01 IU/ML — SIGNIFICANT CHANGE UP
M TB IFN-G CD4+CD8+ BCKGRND COR BLD-ACNC: -0.01 IU/ML — SIGNIFICANT CHANGE UP
QUANT TB PLUS MITOGEN MINUS NIL: 7.87 IU/ML — SIGNIFICANT CHANGE UP

## 2019-02-26 PROCEDURE — 99239 HOSP IP/OBS DSCHRG MGMT >30: CPT

## 2019-02-26 RX ADMIN — ARIPIPRAZOLE 5 MILLIGRAM(S): 15 TABLET ORAL at 08:47

## 2019-02-26 RX ADMIN — Medication 650 MILLIGRAM(S): at 08:46

## 2019-02-26 RX ADMIN — Medication 650 MILLIGRAM(S): at 02:30

## 2019-02-26 RX ADMIN — Medication 1 TABLET(S): at 08:46

## 2019-02-26 RX ADMIN — Medication 100 MILLIGRAM(S): at 08:47

## 2019-02-26 RX ADMIN — Medication 650 MILLIGRAM(S): at 02:00

## 2019-02-28 LAB
CFTR MUT ANL BLD/T: NEGATIVE — SIGNIFICANT CHANGE UP
HCV RNA FLD QL NAA+PROBE: SIGNIFICANT CHANGE UP
HCV RNA SPEC QL PROBE+SIG AMP: DETECTED — SIGNIFICANT CHANGE UP

## 2019-03-14 ENCOUNTER — APPOINTMENT (OUTPATIENT)
Dept: OBGYN | Facility: HOSPITAL | Age: 34
End: 2019-03-14

## 2019-04-04 ENCOUNTER — APPOINTMENT (OUTPATIENT)
Dept: GASTROENTEROLOGY | Facility: HOSPITAL | Age: 34
End: 2019-04-04

## 2019-06-19 ENCOUNTER — OUTPATIENT (OUTPATIENT)
Dept: INPATIENT UNIT | Facility: HOSPITAL | Age: 34
LOS: 1 days | Discharge: HOME | End: 2019-06-19
Payer: MEDICAID

## 2019-06-19 LAB
APPEARANCE UR: CLEAR — SIGNIFICANT CHANGE UP
BACTERIA # UR AUTO: ABNORMAL
BILIRUB UR-MCNC: NEGATIVE — SIGNIFICANT CHANGE UP
COLOR SPEC: YELLOW — SIGNIFICANT CHANGE UP
DIFF PNL FLD: ABNORMAL
EPI CELLS # UR: ABNORMAL
GLUCOSE BLDC GLUCOMTR-MCNC: 110 MG/DL — HIGH (ref 70–99)
GLUCOSE UR QL: NEGATIVE MG/DL — SIGNIFICANT CHANGE UP
KETONES UR-MCNC: ABNORMAL
LEUKOCYTE ESTERASE UR-ACNC: NEGATIVE — SIGNIFICANT CHANGE UP
NITRITE UR-MCNC: NEGATIVE — SIGNIFICANT CHANGE UP
PH UR: 6.5 — SIGNIFICANT CHANGE UP (ref 5–8)
PROT UR-MCNC: 15 MG/DL
RBC CASTS # UR COMP ASSIST: ABNORMAL /HPF (ref 0–4)
SP GR SPEC: 1.02 — SIGNIFICANT CHANGE UP (ref 1.01–1.02)
UROBILINOGEN FLD QL: 1 MG/DL
WBC UR QL: SIGNIFICANT CHANGE UP

## 2019-06-19 RX ORDER — ACETAMINOPHEN 500 MG
975 TABLET ORAL ONCE
Refills: 0 | Status: COMPLETED | OUTPATIENT
Start: 2019-06-19 | End: 2019-06-19

## 2019-06-19 RX ADMIN — Medication 975 MILLIGRAM(S): at 22:09

## 2019-06-20 LAB
CULTURE RESULTS: SIGNIFICANT CHANGE UP
SPECIMEN SOURCE: SIGNIFICANT CHANGE UP

## 2019-06-20 PROCEDURE — 76817 TRANSVAGINAL US OBSTETRIC: CPT | Mod: 26

## 2019-06-20 PROCEDURE — 76815 OB US LIMITED FETUS(S): CPT | Mod: 26

## 2019-06-26 DIAGNOSIS — O47.9 FALSE LABOR, UNSPECIFIED: ICD-10-CM

## 2019-07-30 NOTE — ED PROVIDER NOTE - PROGRESS NOTE ADDITIONAL2
Orthopedic Surgery  Gennaro Walton  2019  Admit Date:  2019  POD: 4 Days Post-Op   Procedure(s):  LEFT INTERTROCHANTERIC FEMUR FRACTURE OPEN REDUCTION AND INTERNAL FIXATION   Proximal left humerus fracture    Much more alert today.   Patient resting comfortably in bed.    Tolerating oral intake.    Denies nausea or vomiting  Denies chest pain or shortness of breath    Vital Sign Ranges  Temperature Temp  Av  F (36.7  C)  Min: 97.9  F (36.6  C)  Max: 98.2  F (36.8  C)   Blood pressure Systolic (24hrs), Av , Min:128 , Max:146        Diastolic (24hrs), Av, Min:56, Max:88      Pulse Pulse  Av.3  Min: 64  Max: 69   Respirations Resp  Avg: 15.9  Min: 14  Max: 18   Pulse oximetry SpO2  Av %  Min: 91 %  Max: 99 %       Dressings are rather saturated   Minimal erythema of the surrounding skin.   Bilateral calves are soft, non-tender.  Left lower extremity is NVI.  Sensation intact bilateral lower extremities  Patient able to resist dorsi and plantar flexion bilaterally  +Dp pulse    Sling in place LUE, NVI LUE    Labs:  Recent Labs   Lab Test 19  0836 19  0641 19  2122   POTASSIUM 3.4 4.1 4.4     Recent Labs   Lab Test 19  0836 19  0641 19  1610   HGB 7.3* 8.6* 6.9*     Recent Labs   Lab Test 19  1414   INR 1.02     Recent Labs   Lab Test 19  2122 19  0730 19  1414    223 261       1. PLAN:   Continue to watch Hgb, will recheck at 4 pm   Mobilize with PT/OT    TTWB LLE .     Continue current pain regiment.   Dressings: Change dressings today   Follow-up: 2 weeks Dr Oseguera    2. Disposition   Anticipate d/c to TCU when medically cleared and progressing in PT.    Kelyl Dunn PA-C     Additional Progress Note...

## 2022-04-24 ENCOUNTER — TRANSCRIPTION ENCOUNTER (OUTPATIENT)
Age: 37
End: 2022-04-24

## 2022-11-08 NOTE — ED PROVIDER NOTE - MEDICAL DECISION MAKING DETAILS
Endocrine Clinic Follow up visit     HPI:   Lisa Allen is a 51 year old female with a history of DLD, gastroparesis, necrotic fascitis of L foot s/p transmetatarsal amputation of L 4th and 5th toes on 06/15/2022, s/p wound irrigation on 2022, s/p L foot BKA on 2022 and type 2 Diabetes mellitus who is presenting to establish care for her Diabetes mellitus.     Diabetes mellitus was diagnosed at age 16. her Diabetes mellitus has previously been managed by PCP. She admits that for 1.5 years before her initial visit with us, she was not following with anyone regularly for diabetes management and not adherent to her medications.      Interval Hx  Has been stressed lately due to a recent house move. Does think it has affected her diabetes management. She remains committed to improving her diabetes     Has started using a prosthetic leg    her current diabetes regimen is:  Metformin  mg daily   Injectable: lantus 32 units twice daily   lispro 25 units before meals + 2 units for every 50 mg/dl > 150 mg/dl glucose pre-meal      Previous: metformin, byetta (GI issues), glucotrol, actos     Reports good compliance   Occasionally misses some doses   Injects in the stomach     Glucose readings:  By recall  Fastin's   Pre-meal: 162, 164, 150-170  Bedtime: 100's    Hypoglycemia events: none    Has hypoglycemia awareness: present       Diabetes History:  Has no family hx of Diabetes mellitus      Diet:  Eats 3 meals / day   BF: Activia, ensure  Lunch: ham+ cheese, ham sandwich with 1 piece of bread  Supper: pork chops, mashed potatoes, vegetables    Has recently made changes to her diet    Diet soda   No juices  1 cup who milk   Lot of water      Exercise: not much recently. Wheelchair bound    Current use of glucocorticoids: none    Last visit with CDE: has met several in the past      Past Medical History:   Diagnosis Date   • Chronic pain    • Depression    • Dyslipidemia    • Gastroparesis    •  Heart murmur    • Palpitations    • Uncontrolled type 2 diabetes mellitus        Past Surgical History:   Procedure Laterality Date   • Amputation Left 2022    left below the knee amp   • Appendectomy     • Carpal tunnel release Bilateral    • Carpal tunnel release  2005   •  section, low transverse     • Esophagogastroduodenoscopy transoral flex w/bx single or mult  2022   • Facet lumbar block  2012    Lumbar Nerve Root Block   • Knee surgery     • Laminectomy      L5-S1   • Lumbar epidural injection  2012   • Lumbar epidural injection  10/22/2012   • Lumbar epidural injection  2013   • Toe amputation Left 2022    4th digit   • Trigger finger release Right    • Tubal ligation  2006       Family History   Problem Relation Age of Onset   • Cancer Mother    • Patient is unaware of any medical problems Sister    • Patient is unaware of any medical problems Sister    • Cataracts Maternal Grandmother    • Glaucoma Maternal Grandmother    • Cataracts Maternal Grandfather    • Glaucoma Maternal Grandfather        Social History     Tobacco Use   • Smoking status: Former Smoker     Packs/day: 0.25     Quit date:      Years since quittin.8   • Smokeless tobacco: Never Used   Vaping Use   • Vaping Use: never used   Substance Use Topics   • Alcohol use: Yes     Alcohol/week: 2.0 standard drinks     Types: 2 Shots of liquor per week     Comment: 2 per month   • Drug use: Never       PHYSICAL EXAM:   Visit Vitals  /62 (BP Location: LUE - Left upper extremity, Patient Position: Sitting, Cuff Size: Large Adult)   Pulse 87   Ht 5' 8\" (1.727 m)   Wt 92.7 kg (204 lb 4.8 oz)   SpO2 98%   BMI 31.06 kg/m²     CONSTITUTIONAL:  awake, alert, cooperative, no apparent distress  EYES:  conjunctivae normal, EOMI   ENT:  Normocephalic, atraumatic  LUNGS: normal respiratory movements  CARDIOVASCULAR:  regular rate and rhythm  ABDOMEN: + abdominal adiposity  SKIN:  no  bruising or bleeding, no rash  MSK: left prosthetic leg attached     Current Outpatient Medications   Medication Sig Dispense Refill   • metoCLOPramide (REGLAN) 5 MG/5ML solution Take 5 mLs by mouth 4 times daily (before meals and nightly). Indications: Delayed or Stopped Emptying of Stomach 600 mL 1   • famotidine (PEPCID) 40 MG tablet Take 1 tablet by mouth nightly. 30 tablet 5   • omeprazole (PriLOSEC) 40 MG capsule Take 1 capsule by mouth daily. Indications: Gastroesophageal Reflux Disease 30 capsule 5   • DISPENSE One stump  for Below the knee amputation left, type 2 DM 1 each prn   • metFORMIN (GLUCOPHAGE-XR) 500 MG 24 hr tablet Take 1 tablet by mouth daily (with breakfast). 90 tablet 3   • Zinc 100 MG Tab Take 100 mg by mouth daily. 30 tablet    • hydroCHLOROthiazide (HYDRODIURIL) 25 MG tablet Take 1 tablet by mouth daily.     • traZODone (DESYREL) 100 MG tablet Take 1 tablet by mouth at bedtime.     • atorvastatin (LIPITOR) 20 MG tablet Take 1 tablet by mouth daily.     • metoPROLOL tartrate (LOPRESSOR) 25 MG tablet Take 1 tablet by mouth 2 times daily.     • insulin glargine (Lantus SoloStar) 100 UNIT/ML pen-injector Inject 20 Units into the skin 2 times daily. Prime 2 units before each dose. 15 mL 0   • Insulin Lispro, 1 Unit Dial, (HumaLOG KwikPen) 100 UNIT/ML pen-injector Inject 20 Units into the skin 3 times daily (before meals). Give additional insulin according to following scale.  Blood Glucose less than 150 mg/dL: GIVE 0 units; -199: GIVE 1 additional unit; -249: GIVE 2 additional units; -299 mg/dL: GIVE 3 additional units; -349 mg/dL: GIVE 4 additional units;  mg/dL or greater: GIVE 5 additional units 15 mL 0   • blood glucose meter Test blood sugar 3 times daily as directed. 1 kit 0   • blood glucose test strip Test blood sugar 3 times daily as directed. 100 each 0   • blood glucose lancets Test blood sugar 3 times daily as directed. 100 each 0   • Insulin Pen  Needle (Pen Needles) 31G X 5 MM Misc 1 each as directed. Use with Lantus & Humalog insulin pen injections. 100 each 0   • polyethylene glycol (MiraLax) 17 GM/SCOOP powder Take 17 g by mouth daily. Stir and dissolve powder in any 4 to 8 ounces of beverage, then drink. Hold for loose stools. 238 g 0   • oxyCODONE, IMM REL, (ROXICODONE) 5 MG immediate release tablet Take 5 mg by mouth 3 times daily as needed for Pain.      • Multiple Vitamins-Minerals (vitamin - therapeutic multivitamins w/minerals) tablet Take 1 tablet by mouth daily.     • gabapentin (NEURONTIN) 400 MG capsule Take 1 capsule by mouth in the morning and then take 2 capsules by mouth at bedtime.       No current facility-administered medications for this visit.         DATA:   No results found for: GAD65,   Lab Results   Component Value Date    SODIUM 138 08/09/2022    SODIUM 141 08/08/2022    SODIUM 135 08/07/2022    POTASSIUM 3.6 08/09/2022    POTASSIUM 3.7 08/08/2022    POTASSIUM 3.4 08/08/2022    CHLORIDE 104 08/09/2022    CHLORIDE 106 08/08/2022    CHLORIDE 96 (L) 08/07/2022    CO2 26 08/09/2022    CO2 27 08/08/2022    CO2 23 08/07/2022    BUN 18 08/09/2022    BUN 18 08/08/2022    BUN 21 (H) 08/07/2022    CREATININE 0.82 08/09/2022    CREATININE 0.81 08/08/2022    CREATININE 0.80 08/07/2022    GLUCOSE 163 (H) 08/09/2022    GLUCOSE 114 (H) 08/08/2022    GLUCOSE 376 (H) 08/07/2022     Vitamin B12:     ASSESSMENT AND PLAN:    1. 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 Type 2 diabetes mellitus with hyperglycemia, with long-term current use of insulin (CMS/[facility])    her current diabetes regimen is:    lispro 25 units three times daily + 1 unit for every 50 mg/dl > 150 mg/dl  Injectable: lantus 32 units twice daily    Previous: metformin, byetta (GI issues), glucotrol, actos     BG readings: as noted above   Hypos: none  A1c: 13%->8%-->  7.7%    Diabetes History:  Diagnosed with T2DM at age 16  Has no family hx of DM     HISTORY OF DIABETES COMPLICATIONS:  History of Retinopathy: moderate non-proliferative diabetic retinopathy both eyes - last eye exam 9/28/22 with Dr Stephens  History of Neuropathy: present  History of foot ulcers: none   History of Nephropathy:   Glomerular Filtration Rate (no units)   Date Value   08/09/2022 86     History of Gastroparesis: yes    Previous episodes of DKA: never     Previous episodes of pancreatitis: none   Previous episodes of UTIs: none   H/o alcohol use: rarely   Family hx of MTC: none    ASSOCIATED COMPLICATIONS:   HTN: present  Hyperlipidemia:- Takes atorvastatin 20 mg daily, reports adherence. Last Lipid Panel: 4/15/22:    LDL 34  HDL 31    Coronary Artery Disease:  no  Cerebrovascular Disease: no    CHF present: no    CKD 3 or higher: no    A1c and overall glucose control continues to improve.     Her reported glucose readings are good, but not at goal yet. Last A1c on 10/27/22 was down to 7.7%. Expect overall glucose control to continue to improve with current measures     She is tolerating her metformin well. I advised her to ramp up her metformin dose over the next few weeks to 2000 mg daily    Cautioned regarding hypoglycemia and advised to down-titrate her lantus dose as needed based on her fasting glucose readings.     PLAN:  - Metformin XR 1000 mg twice daily   - Continue current insulin regimen     A total of 32 minutes was spent today (11/8/2022) in reviewing data/other clinical notes before office visit, with the patient, and after the visit to complete documentation. This does not include the time spent on CGMS interpretation    Devonte Rizvi MD  Endocrinology, Diabetes and Metabolism      see progress notes above

## 2024-01-13 NOTE — ED BEHAVIORAL HEALTH ASSESSMENT NOTE - NS ED BHA BILLING ATTENDING W NP SUPERVISION ATTESTATION
-- DO NOT REPLY / DO NOT REPLY ALL --  -- Message is from Engagement Center Operations (ECO) --    General Patient Message: Patient is returning call in regards to her strep results, please call back. She does want an antibiotic.    Caller Information         Type Contact Phone/Fax    01/13/2024 03:33 PM CST Phone (Incoming) Omid Kate (Self) [phone] (M)          Alternative phone number: no    Can a detailed message be left? Yes    Message Turnaround:     Is it Working Hours? No - After Hours/Weekend/Holiday     Did the caller agree that this message can wait until the office reopens in the morning? YES - The Message Can Wait - Send a message to the provider's clinical support pool                      Agree

## 2024-04-09 NOTE — ED ADULT TRIAGE NOTE - NS ED NURSE BANDS TYPE
Halima Brown (:  
Name band;